# Patient Record
Sex: FEMALE | Race: BLACK OR AFRICAN AMERICAN
[De-identification: names, ages, dates, MRNs, and addresses within clinical notes are randomized per-mention and may not be internally consistent; named-entity substitution may affect disease eponyms.]

---

## 2022-03-08 PROBLEM — Z00.00 ENCOUNTER FOR PREVENTIVE HEALTH EXAMINATION: Status: ACTIVE | Noted: 2022-03-08

## 2022-03-09 ENCOUNTER — APPOINTMENT (OUTPATIENT)
Dept: UROLOGY | Facility: CLINIC | Age: 47
End: 2022-03-09
Payer: COMMERCIAL

## 2022-03-09 VITALS
HEIGHT: 68 IN | DIASTOLIC BLOOD PRESSURE: 76 MMHG | BODY MASS INDEX: 21.22 KG/M2 | HEART RATE: 80 BPM | TEMPERATURE: 97.5 F | OXYGEN SATURATION: 99 % | SYSTOLIC BLOOD PRESSURE: 118 MMHG | WEIGHT: 140 LBS

## 2022-03-09 DIAGNOSIS — Z84.1 FAMILY HISTORY OF DISORDERS OF KIDNEY AND URETER: ICD-10-CM

## 2022-03-09 DIAGNOSIS — R10.9 UNSPECIFIED ABDOMINAL PAIN: ICD-10-CM

## 2022-03-09 DIAGNOSIS — Z78.9 OTHER SPECIFIED HEALTH STATUS: ICD-10-CM

## 2022-03-09 DIAGNOSIS — N20.0 CALCULUS OF KIDNEY: ICD-10-CM

## 2022-03-09 PROCEDURE — 99204 OFFICE O/P NEW MOD 45 MIN: CPT

## 2022-03-09 NOTE — ASSESSMENT
[FreeTextEntry1] : Ms. Childress is a very pleasant 46 year old woman here today for evaluation for left flank pain and kidney stones.\par CT images from [facility] radiology from 1/2022 reviewed demonstrating a 1 mm stone in the left kidney with no hydronephrosis no ureteral stones\par -We discussed that a 1 mm nonobstructing intrarenal stone is unlikely because of her left-sided back pain\par -We discussed alternative etiologies of left back pain, including musculoskeletal and neurologic etiologies\par UC.\par UA.\par RTO in 6 months for renal US.

## 2022-03-09 NOTE — REVIEW OF SYSTEMS
[both] : pain during and after intercourse [denies] : denies pain with orgasm [History of kidney stones] : history of kidney stones [Negative] : Heme/Lymph

## 2022-03-09 NOTE — HISTORY OF PRESENT ILLNESS
[Urinary Urgency] : urinary urgency [Urinary Frequency] : urinary frequency [Flank Pain] : flank pain [7] : 7 [FreeTextEntry1] : Ms. Childress is a very pleasant 46 year old woman here today for evaluation for left flank pain and kidney stones.\par She reports that for the past few months she has had constant left sided back pain.\par She reports that she saw her PCP who ordered an "xray" and "CT scan" that showed a kidney stone.\par She got the CT scan done at [facility] radiology.\par She reports increased urgency and frequency.\par Denies hematuria, dysuria, nausea, vomiting or fevers.\par Reports that prior to discovering stone she had low po intake of water; reports she now drinks close to 3L daily.\par Denies personal history of kidney stones; reports her brother has had kidney stones.\par Denies family history of urological cancers.\par Denies history of smoking.

## 2022-03-10 LAB
APPEARANCE: CLEAR
BACTERIA: NEGATIVE
BILIRUBIN URINE: NEGATIVE
BLOOD URINE: NEGATIVE
COLOR: COLORLESS
GLUCOSE QUALITATIVE U: NEGATIVE
HYALINE CASTS: 0 /LPF
KETONES URINE: NEGATIVE
LEUKOCYTE ESTERASE URINE: NEGATIVE
MICROSCOPIC-UA: NORMAL
NITRITE URINE: NEGATIVE
PH URINE: 7
PROTEIN URINE: NEGATIVE
RED BLOOD CELLS URINE: 0 /HPF
SPECIFIC GRAVITY URINE: 1.01
SQUAMOUS EPITHELIAL CELLS: 0 /HPF
UROBILINOGEN URINE: NORMAL
WHITE BLOOD CELLS URINE: 0 /HPF

## 2022-03-12 LAB — BACTERIA UR CULT: NORMAL

## 2022-09-09 ENCOUNTER — APPOINTMENT (OUTPATIENT)
Dept: UROLOGY | Facility: CLINIC | Age: 47
End: 2022-09-09